# Patient Record
Sex: FEMALE | Race: OTHER | NOT HISPANIC OR LATINO | Employment: OTHER | URBAN - METROPOLITAN AREA
[De-identification: names, ages, dates, MRNs, and addresses within clinical notes are randomized per-mention and may not be internally consistent; named-entity substitution may affect disease eponyms.]

---

## 2021-04-26 ENCOUNTER — APPOINTMENT (OUTPATIENT)
Dept: ADMISSIONS | Facility: MEDICAL CENTER | Age: 56
End: 2021-04-26
Payer: MEDICARE

## 2021-04-28 ENCOUNTER — HOSPITAL ENCOUNTER (OUTPATIENT)
Dept: RADIOLOGY | Facility: MEDICAL CENTER | Age: 56
End: 2021-04-28
Attending: PODIATRIST | Admitting: PODIATRIST
Payer: MEDICARE

## 2021-04-28 ENCOUNTER — ANESTHESIA EVENT (OUTPATIENT)
Dept: SURGERY | Facility: MEDICAL CENTER | Age: 56
End: 2021-04-28
Payer: MEDICARE

## 2021-04-28 ENCOUNTER — PRE-ADMISSION TESTING (OUTPATIENT)
Dept: ADMISSIONS | Facility: MEDICAL CENTER | Age: 56
End: 2021-04-28
Attending: PODIATRIST
Payer: MEDICARE

## 2021-04-28 DIAGNOSIS — Z01.812 PRE-OPERATIVE LABORATORY EXAMINATION: ICD-10-CM

## 2021-04-28 DIAGNOSIS — Z01.818 PREOP EXAMINATION: ICD-10-CM

## 2021-04-28 LAB
ERYTHROCYTE [DISTWIDTH] IN BLOOD BY AUTOMATED COUNT: 47 FL (ref 35.9–50)
HCT VFR BLD AUTO: 44.2 % (ref 37–47)
HGB BLD-MCNC: 14.4 G/DL (ref 12–16)
MCH RBC QN AUTO: 30.7 PG (ref 27–33)
MCHC RBC AUTO-ENTMCNC: 32.6 G/DL (ref 33.6–35)
MCV RBC AUTO: 94.2 FL (ref 81.4–97.8)
PLATELET # BLD AUTO: 224 K/UL (ref 164–446)
PMV BLD AUTO: 9.2 FL (ref 9–12.9)
RBC # BLD AUTO: 4.69 M/UL (ref 4.2–5.4)
WBC # BLD AUTO: 8.1 K/UL (ref 4.8–10.8)

## 2021-04-28 PROCEDURE — 85027 COMPLETE CBC AUTOMATED: CPT

## 2021-04-28 PROCEDURE — 36415 COLL VENOUS BLD VENIPUNCTURE: CPT

## 2021-04-28 PROCEDURE — 72040 X-RAY EXAM NECK SPINE 2-3 VW: CPT

## 2021-04-28 RX ORDER — ALENDRONATE SODIUM 70 MG/1
70 TABLET ORAL DAILY
COMMUNITY
Start: 2021-04-13

## 2021-04-28 RX ORDER — MECLIZINE HYDROCHLORIDE 25 MG/1
25 TABLET ORAL PRN
COMMUNITY
Start: 2021-03-15

## 2021-04-28 RX ORDER — CARBOXYMETHYLCELLULOSE SODIUM 0.5 G/100ML
1 SOLUTION/ DROPS OPHTHALMIC 2 TIMES DAILY
COMMUNITY
Start: 2021-04-14

## 2021-04-28 RX ORDER — MELOXICAM 15 MG/1
1 TABLET ORAL DAILY
COMMUNITY
Start: 2021-04-13

## 2021-04-28 RX ORDER — PRENATAL VIT/IRON FUM/FOLIC AC 27MG-0.8MG
1 TABLET ORAL DAILY
COMMUNITY
Start: 2021-04-13

## 2021-04-28 RX ORDER — ERGOCALCIFEROL 1.25 MG/1
1 CAPSULE ORAL
COMMUNITY
Start: 2021-04-13

## 2021-04-28 RX ORDER — FOLIC ACID 1 MG/1
1 TABLET ORAL DAILY
COMMUNITY
Start: 2021-04-13

## 2021-04-28 RX ORDER — PANTOPRAZOLE SODIUM 40 MG/1
40 TABLET, DELAYED RELEASE ORAL DAILY
COMMUNITY
Start: 2021-04-13

## 2021-04-28 RX ORDER — CYCLOSPORINE 0.5 MG/ML
1 EMULSION OPHTHALMIC 2 TIMES DAILY
COMMUNITY
Start: 2021-04-13

## 2021-04-28 RX ORDER — MAGNESIUM OXIDE 400 MG/1
400 TABLET ORAL DAILY
COMMUNITY
Start: 2021-04-13

## 2021-04-28 NOTE — PREPROCEDURE INSTRUCTIONS
Hx and meds reviewed, pre op instructions given, handouts reviewed, questions answered.  Pt instructed to continue regularly prescribed medications through day before surgery.Per anesthesia protocol pt instructed to take these medications with a sip of water the day of surgery-restasis and lubricating eye drops, protonix and if needed proair inh.. Anesthesia fasting guidelines reviewed with pt. Covid testing done in [place],  states has been self isolating since tested on 4/24. Report to be faxed from [facility] in [place].

## 2021-04-29 ENCOUNTER — APPOINTMENT (OUTPATIENT)
Dept: RADIOLOGY | Facility: MEDICAL CENTER | Age: 56
End: 2021-04-29
Attending: PODIATRIST
Payer: MEDICARE

## 2021-04-29 ENCOUNTER — HOSPITAL ENCOUNTER (OUTPATIENT)
Facility: MEDICAL CENTER | Age: 56
End: 2021-04-29
Attending: PODIATRIST | Admitting: PODIATRIST
Payer: MEDICARE

## 2021-04-29 ENCOUNTER — ANESTHESIA (OUTPATIENT)
Dept: SURGERY | Facility: MEDICAL CENTER | Age: 56
End: 2021-04-29
Payer: MEDICARE

## 2021-04-29 VITALS
RESPIRATION RATE: 16 BRPM | OXYGEN SATURATION: 92 % | DIASTOLIC BLOOD PRESSURE: 71 MMHG | BODY MASS INDEX: 25.76 KG/M2 | WEIGHT: 139.99 LBS | TEMPERATURE: 97.7 F | HEIGHT: 62 IN | HEART RATE: 85 BPM | SYSTOLIC BLOOD PRESSURE: 109 MMHG

## 2021-04-29 PROCEDURE — 160009 HCHG ANES TIME/MIN: Performed by: PODIATRIST

## 2021-04-29 PROCEDURE — 501838 HCHG SUTURE GENERAL: Performed by: PODIATRIST

## 2021-04-29 PROCEDURE — 700101 HCHG RX REV CODE 250: Performed by: PODIATRIST

## 2021-04-29 PROCEDURE — 700111 HCHG RX REV CODE 636 W/ 250 OVERRIDE (IP): Performed by: STUDENT IN AN ORGANIZED HEALTH CARE EDUCATION/TRAINING PROGRAM

## 2021-04-29 PROCEDURE — 700105 HCHG RX REV CODE 258: Performed by: STUDENT IN AN ORGANIZED HEALTH CARE EDUCATION/TRAINING PROGRAM

## 2021-04-29 PROCEDURE — 160036 HCHG PACU - EA ADDL 30 MINS PHASE I: Performed by: PODIATRIST

## 2021-04-29 PROCEDURE — 160041 HCHG SURGERY MINUTES - EA ADDL 1 MIN LEVEL 4: Performed by: PODIATRIST

## 2021-04-29 PROCEDURE — 160046 HCHG PACU - 1ST 60 MINS PHASE II: Performed by: PODIATRIST

## 2021-04-29 PROCEDURE — A9270 NON-COVERED ITEM OR SERVICE: HCPCS | Performed by: STUDENT IN AN ORGANIZED HEALTH CARE EDUCATION/TRAINING PROGRAM

## 2021-04-29 PROCEDURE — 700101 HCHG RX REV CODE 250: Performed by: STUDENT IN AN ORGANIZED HEALTH CARE EDUCATION/TRAINING PROGRAM

## 2021-04-29 PROCEDURE — 160025 RECOVERY II MINUTES (STATS): Performed by: PODIATRIST

## 2021-04-29 PROCEDURE — 64447 NJX AA&/STRD FEMORAL NRV IMG: CPT | Performed by: PODIATRIST

## 2021-04-29 PROCEDURE — 700102 HCHG RX REV CODE 250 W/ 637 OVERRIDE(OP): Performed by: PODIATRIST

## 2021-04-29 PROCEDURE — 160002 HCHG RECOVERY MINUTES (STAT): Performed by: PODIATRIST

## 2021-04-29 PROCEDURE — 160029 HCHG SURGERY MINUTES - 1ST 30 MINS LEVEL 4: Performed by: PODIATRIST

## 2021-04-29 PROCEDURE — 500881 HCHG PACK, EXTREMITY: Performed by: PODIATRIST

## 2021-04-29 PROCEDURE — 160048 HCHG OR STATISTICAL LEVEL 1-5: Performed by: PODIATRIST

## 2021-04-29 PROCEDURE — 700102 HCHG RX REV CODE 250 W/ 637 OVERRIDE(OP): Performed by: STUDENT IN AN ORGANIZED HEALTH CARE EDUCATION/TRAINING PROGRAM

## 2021-04-29 PROCEDURE — 160035 HCHG PACU - 1ST 60 MINS PHASE I: Performed by: PODIATRIST

## 2021-04-29 PROCEDURE — A9270 NON-COVERED ITEM OR SERVICE: HCPCS | Performed by: PODIATRIST

## 2021-04-29 PROCEDURE — C1713 ANCHOR/SCREW BN/BN,TIS/BN: HCPCS | Performed by: PODIATRIST

## 2021-04-29 PROCEDURE — 502240 HCHG MISC OR SUPPLY RC 0272: Performed by: PODIATRIST

## 2021-04-29 PROCEDURE — A6223 GAUZE >16<=48 NO W/SAL W/O B: HCPCS | Performed by: PODIATRIST

## 2021-04-29 PROCEDURE — 700105 HCHG RX REV CODE 258: Performed by: PODIATRIST

## 2021-04-29 PROCEDURE — 64445 NJX AA&/STRD SCIATIC NRV IMG: CPT | Performed by: PODIATRIST

## 2021-04-29 PROCEDURE — 73620 X-RAY EXAM OF FOOT: CPT | Mod: LT

## 2021-04-29 PROCEDURE — 502000 HCHG MISC OR IMPLANTS RC 0278: Performed by: PODIATRIST

## 2021-04-29 DEVICE — IMPLANTABLE DEVICE: Type: IMPLANTABLE DEVICE | Site: FOOT | Status: FUNCTIONAL

## 2021-04-29 RX ORDER — OXYCODONE HCL 5 MG/5 ML
5 SOLUTION, ORAL ORAL
Status: DISCONTINUED | OUTPATIENT
Start: 2021-04-29 | End: 2021-04-29 | Stop reason: HOSPADM

## 2021-04-29 RX ORDER — ACETAMINOPHEN 500 MG
TABLET ORAL
Status: COMPLETED
Start: 2021-04-29 | End: 2021-04-29

## 2021-04-29 RX ORDER — HALOPERIDOL 5 MG/ML
1 INJECTION INTRAMUSCULAR
Status: DISCONTINUED | OUTPATIENT
Start: 2021-04-29 | End: 2021-04-29 | Stop reason: HOSPADM

## 2021-04-29 RX ORDER — ONDANSETRON 2 MG/ML
INJECTION INTRAMUSCULAR; INTRAVENOUS PRN
Status: DISCONTINUED | OUTPATIENT
Start: 2021-04-29 | End: 2021-04-29 | Stop reason: SURG

## 2021-04-29 RX ORDER — KETOROLAC TROMETHAMINE 30 MG/ML
INJECTION, SOLUTION INTRAMUSCULAR; INTRAVENOUS PRN
Status: DISCONTINUED | OUTPATIENT
Start: 2021-04-29 | End: 2021-04-29 | Stop reason: SURG

## 2021-04-29 RX ORDER — CEFAZOLIN SODIUM 1 G/3ML
INJECTION, POWDER, FOR SOLUTION INTRAMUSCULAR; INTRAVENOUS PRN
Status: DISCONTINUED | OUTPATIENT
Start: 2021-04-29 | End: 2021-04-29 | Stop reason: SURG

## 2021-04-29 RX ORDER — DEXAMETHASONE SODIUM PHOSPHATE 4 MG/ML
INJECTION, SOLUTION INTRA-ARTICULAR; INTRALESIONAL; INTRAMUSCULAR; INTRAVENOUS; SOFT TISSUE PRN
Status: DISCONTINUED | OUTPATIENT
Start: 2021-04-29 | End: 2021-04-29 | Stop reason: SURG

## 2021-04-29 RX ORDER — MIDAZOLAM HYDROCHLORIDE 1 MG/ML
INJECTION INTRAMUSCULAR; INTRAVENOUS PRN
Status: DISCONTINUED | OUTPATIENT
Start: 2021-04-29 | End: 2021-04-29 | Stop reason: SURG

## 2021-04-29 RX ORDER — ROPIVACAINE HYDROCHLORIDE 5 MG/ML
INJECTION, SOLUTION EPIDURAL; INFILTRATION; PERINEURAL
Status: COMPLETED | OUTPATIENT
Start: 2021-04-29 | End: 2021-04-29

## 2021-04-29 RX ORDER — BUPIVACAINE HYDROCHLORIDE 5 MG/ML
INJECTION, SOLUTION EPIDURAL; INTRACAUDAL
Status: DISCONTINUED | OUTPATIENT
Start: 2021-04-29 | End: 2021-04-29 | Stop reason: HOSPADM

## 2021-04-29 RX ORDER — SODIUM CHLORIDE, SODIUM LACTATE, POTASSIUM CHLORIDE, CALCIUM CHLORIDE 600; 310; 30; 20 MG/100ML; MG/100ML; MG/100ML; MG/100ML
INJECTION, SOLUTION INTRAVENOUS CONTINUOUS
Status: DISCONTINUED | OUTPATIENT
Start: 2021-04-29 | End: 2021-04-29

## 2021-04-29 RX ORDER — OXYCODONE HCL 5 MG/5 ML
10 SOLUTION, ORAL ORAL
Status: DISCONTINUED | OUTPATIENT
Start: 2021-04-29 | End: 2021-04-29 | Stop reason: HOSPADM

## 2021-04-29 RX ORDER — PHENYLEPHRINE HCL IN 0.9% NACL 0.5 MG/5ML
SYRINGE (ML) INTRAVENOUS PRN
Status: DISCONTINUED | OUTPATIENT
Start: 2021-04-29 | End: 2021-04-29 | Stop reason: SURG

## 2021-04-29 RX ORDER — ACETAMINOPHEN 325 MG/1
TABLET ORAL PRN
Status: DISCONTINUED | OUTPATIENT
Start: 2021-04-29 | End: 2021-04-29 | Stop reason: SURG

## 2021-04-29 RX ORDER — ATORVASTATIN CALCIUM 10 MG/1
10 TABLET, FILM COATED ORAL
COMMUNITY
Start: 2021-03-23

## 2021-04-29 RX ORDER — ONDANSETRON 2 MG/ML
4 INJECTION INTRAMUSCULAR; INTRAVENOUS
Status: DISCONTINUED | OUTPATIENT
Start: 2021-04-29 | End: 2021-04-29 | Stop reason: HOSPADM

## 2021-04-29 RX ADMIN — Medication 100 MCG: at 08:05

## 2021-04-29 RX ADMIN — Medication 100 MCG: at 07:48

## 2021-04-29 RX ADMIN — SODIUM CHLORIDE, POTASSIUM CHLORIDE, SODIUM LACTATE AND CALCIUM CHLORIDE: 600; 310; 30; 20 INJECTION, SOLUTION INTRAVENOUS at 06:49

## 2021-04-29 RX ADMIN — PROPOFOL 120 MG: 10 INJECTION, EMULSION INTRAVENOUS at 07:39

## 2021-04-29 RX ADMIN — PHENYLEPHRINE HYDROCHLORIDE 35 MCG/MIN: 10 INJECTION INTRAVENOUS at 08:22

## 2021-04-29 RX ADMIN — POVIDONE-IODINE 15 ML: 10 SOLUTION TOPICAL at 06:48

## 2021-04-29 RX ADMIN — ONDANSETRON 4 MG: 2 INJECTION INTRAMUSCULAR; INTRAVENOUS at 07:47

## 2021-04-29 RX ADMIN — ROPIVACAINE HYDROCHLORIDE 10 ML: 5 INJECTION, SOLUTION EPIDURAL; INFILTRATION; PERINEURAL at 07:17

## 2021-04-29 RX ADMIN — Medication 100 MCG: at 07:55

## 2021-04-29 RX ADMIN — MIDAZOLAM HYDROCHLORIDE 2 MG: 1 INJECTION, SOLUTION INTRAMUSCULAR; INTRAVENOUS at 07:39

## 2021-04-29 RX ADMIN — DEXAMETHASONE SODIUM PHOSPHATE 4 MG: 4 INJECTION, SOLUTION INTRAMUSCULAR; INTRAVENOUS at 07:47

## 2021-04-29 RX ADMIN — Medication 100 MCG: at 08:17

## 2021-04-29 RX ADMIN — EPHEDRINE SULFATE 10 MG: 50 INJECTION INTRAMUSCULAR; INTRAVENOUS; SUBCUTANEOUS at 07:49

## 2021-04-29 RX ADMIN — CEFAZOLIN 2 G: 1 INJECTION, POWDER, FOR SOLUTION INTRAVENOUS at 07:39

## 2021-04-29 RX ADMIN — FENTANYL CITRATE 50 MCG: 50 INJECTION, SOLUTION INTRAMUSCULAR; INTRAVENOUS at 07:39

## 2021-04-29 RX ADMIN — KETOROLAC TROMETHAMINE 15 MG: 30 INJECTION, SOLUTION INTRAMUSCULAR at 09:14

## 2021-04-29 RX ADMIN — Medication 50 MCG: at 08:22

## 2021-04-29 RX ADMIN — ACETAMINOPHEN 1000 MG: 325 TABLET, FILM COATED ORAL at 07:10

## 2021-04-29 RX ADMIN — Medication 100 MCG: at 08:11

## 2021-04-29 RX ADMIN — ROPIVACAINE HYDROCHLORIDE 20 ML: 5 INJECTION, SOLUTION EPIDURAL; INFILTRATION; PERINEURAL at 07:12

## 2021-04-29 ASSESSMENT — PAIN SCALES - GENERAL: PAIN_LEVEL: 0

## 2021-04-29 NOTE — ANESTHESIA PREPROCEDURE EVALUATION
55 yo F w/ hx of GERD, RA. NPO appropriate. METS >4.     Relevant Problems   Other   (+) Rheumatoid arthritis (HCC)       Physical Exam    Airway   Mallampati: III  TM distance: >3 FB  Neck ROM: full       Cardiovascular - normal exam  Rhythm: regular  Rate: normal  (-) murmur     Dental   (+) upper dentures, lower dentures           Pulmonary - normal exam  Breath sounds clear to auscultation     Abdominal    Neurological - normal exam                 Anesthesia Plan    ASA 2       Plan - general and peripheral nerve block     Peripheral nerve block will be post-op pain control  Airway plan will be LMA          Induction: intravenous    Postoperative Plan: Postoperative administration of opioids is intended.    Pertinent diagnostic labs and testing reviewed    Informed Consent:    Anesthetic plan and risks discussed with patient.    Use of blood products discussed with: patient whom consented to blood products.

## 2021-04-29 NOTE — ANESTHESIA PROCEDURE NOTES
Airway    Date/Time: 4/29/2021 7:41 AM  Performed by: Clem Landin M.D.  Authorized by: Clem Landin M.D.     Location:  OR  Urgency:  Elective  Indications for Airway Management:  Anesthesia      Spontaneous Ventilation: absent    Sedation Level:  Deep  Preoxygenated: Yes    Mask Difficulty Assessment:  0 - not attempted  Final Airway Type:  Supraglottic airway  Final Supraglottic Airway:  Standard LMA    SGA Size:  4  Number of Attempts at Approach:  1

## 2021-04-29 NOTE — ANESTHESIA TIME REPORT
Anesthesia Start and Stop Event Times     Date Time Event    4/29/2021 0720 Ready for Procedure     0730 Anesthesia Start     0934 Anesthesia Stop        Responsible Staff  04/29/21    Name Role Begin End    Min NATHAN Landin M.D. Anesth 0730 0934        Preop Diagnosis (Free Text):  Pre-op Diagnosis     HALLUX VALGUS (AQUIRED), LEFT FOOT/SPRAIN OF METATARSOPHALANGEAL JOINT OF LEFT LESSER TOE        Preop Diagnosis (Codes):    Post op Diagnosis  Hallux valgus      Premium Reason  Non-Premium    Comments:

## 2021-04-29 NOTE — OR NURSING
0933 PT RECEIVED IN PACU, REPORT RECEIVED.  PT RECEIVED IN STAGE 2, REPORT RECEIVED. VSS    0943 PT AWAKE, DENIES PAIN.     1007 PT TOLERATING PO FLUIDS.     1020 PT DENIES PAIN, NO MOVEMENT TO GREAT TOE, PT REPORTS LLE NUMB AND DENIES SENSATION TO TOUCH TO GREAT TOE.      1036 VSS. PT MEETS CRITERIA FOR TRANSFER TO STAGE 2.

## 2021-04-29 NOTE — OR NURSING
0623: Pt brought back from waiting room and assumed care.    0655: Patient allergies and NPO status verified, home medication reconciliation completed, belongings secured in a locker. Patient verbalizes understanding of pain scale, expected course of stay, and plan of care. Surgical site verified with patient. PIV access established. Sequentials in place as ordered. Triple aim completed by CNA.

## 2021-04-29 NOTE — ANESTHESIA PROCEDURE NOTES
Peripheral Block    Date/Time: 4/29/2021 7:17 AM  Performed by: Clem Landin M.D.  Authorized by: Clem Landin M.D.     Patient Location:  Pre-op  Start Time:  4/29/2021 7:17 AM  End Time:  4/29/2021 7:19 AM  Reason for Block: at surgeon's request and post-op pain management ONLY    patient identified, IV checked, site marked, risks and benefits discussed, surgical consent, monitors and equipment checked, pre-op evaluation and timeout performed    Patient Position:  Supine  Prep: ChloraPrep    Monitoring:  Heart rate, continuous pulse ox and cardiac monitor  Block Region:  Lower Extremity  Lower Extremity - Block Type:  Selective FEMORAL nerve block at the Adductor Canal    Laterality:  Left  Procedures: ultrasound guided  Image captured, interpreted and electronically stored.  Local Infiltration:  Lidocaine  Strength:  2 %  Dose:  2 ml  Block Type:  Single-shot  Needle Length:  100mm  Needle Gauge:  21 G  Needle Localization:  Ultrasound guidance  Injection Assessment:  Negative aspiration for heme, no paresthesia on injection, incremental injection and local visualized surrounding nerve on ultrasound  Evidence of intravascular injection: No     US Guided Selective Femoral Nerve Block at Adductor Canal:   US probe placed at mid-thigh level on externally rotated leg and femur identified.  Probe directed medially until Sartorius Muscle (SM), Femoral Artery (FA) and Saphenous Nerve (SN) identified in Adductor Canal (AC).  Needle inserted anterolateral to probe in an in plane approach into a subsartorial perivascular perineural position.  After negative aspiration LA injected with ease and visualized spreading within the AC.

## 2021-04-29 NOTE — OR NURSING
1047 Pt arrived from PACU post   FUSION, MTP JOINT, FIRST - FOR ARTHRODESIS Left General   EXCISION, METATARSAL BONE, HEAD -SECOND Left     , report received. Pt states pain is tollerable and denies nausea at this time. Pt dressing @ BS with assistance.    1105 Discharge instructions and medications gone over with Pt and ride. All questions answered. Pt meets DC criteria. PIV DC'd. Pt transported to POV via WC in stable condition.

## 2021-04-29 NOTE — ANESTHESIA POSTPROCEDURE EVALUATION
Patient: Darcy Longoria    Procedure Summary     Date: 04/29/21 Room / Location:  OR  / SURGERY [facility]    Anesthesia Start: 0730 Anesthesia Stop: 0934    Procedures:       FUSION, MTP JOINT, FIRST - FOR ARTHRODESIS (Left Foot)      EXCISION, METATARSAL BONE, HEAD -SECOND (Left Foot) Diagnosis: (HALLUX VALGUS (AQUIRED), LEFT FOOT/SPRAIN OF METATARSOPHALANGEAL JOINT OF LEFT LESSER TOE)    Surgeons: Santiago Milton D.P.M. Responsible Provider: Clem Landin M.D.    Anesthesia Type: general, peripheral nerve block ASA Status: 2          Final Anesthesia Type: general, peripheral nerve block  Last vitals  BP   Blood Pressure: 109/71    Temp   36.5 °C (97.7 °F)    Pulse   85   Resp   16    SpO2   92 %      Anesthesia Post Evaluation    Patient location during evaluation: bedside  Patient participation: complete - patient participated  Level of consciousness: awake and alert  Pain score: 0    Airway patency: patent  Anesthetic complications: no  Cardiovascular status: hemodynamically stable  Respiratory status: acceptable  Hydration status: euvolemic    PONV: none    patient able to participate, but full recovery from regional anesthesia has not occurred and is not expected within the stipulated timeframe for the completion of the evaluation      No complications documented.     Nurse Pain Score: 0 (NPRS)

## 2021-04-29 NOTE — ANESTHESIA PROCEDURE NOTES
Peripheral Block    Date/Time: 4/29/2021 7:12 AM  Performed by: Clem Landin M.D.  Authorized by: Clem Landin M.D.     Patient Location:  Pre-op  Start Time:  4/29/2021 7:12 AM  End Time:  4/29/2021 7:16 AM  Reason for Block: at surgeon's request and post-op pain management ONLY    patient identified, IV checked, site marked, risks and benefits discussed, surgical consent, monitors and equipment checked, pre-op evaluation and timeout performed    Patient Position:  Supine  Prep: ChloraPrep    Monitoring:  Heart rate, continuous pulse ox and cardiac monitor  Block Region:  Lower Extremity  Lower Extremity - Block Type:  SCIATIC nerve block, lateral approach    Laterality:  Left  Procedures: ultrasound guided  Image captured, interpreted and electronically stored.  Local Infiltration:  Lidocaine  Strength:  2 %  Dose:  3 ml  Block Type:  Single-shot  Needle Length:  100mm  Needle Gauge:  21 G  Needle Localization:  Ultrasound guidance  Injection Assessment:  Negative aspiration for heme, no paresthesia on injection, incremental injection, local visualized surrounding nerve on ultrasound and paresthesia-transient/resolved  Evidence of intravascular injection: No     US Guided Sciatic Nerve Block   US probe placed several cm proximal to popliteal crease on posterior thigh and scanned caudad and cephalad until Sciatic Nerve (SN) identified superficial/lateral to popliteal artery.  Needle inserted lateral to probe in an in plane approach under direct visualization to a perineural position.  After negative aspiration LA injected with ease and visualized surrounding the SN.

## 2021-04-29 NOTE — DISCHARGE INSTRUCTIONS
ACTIVITY: Rest and take it easy for the first 24 hours.  A responsible adult is recommended to remain with you during that time.  It is normal to feel sleepy.  We encourage you to not do anything that requires balance, judgment or coordination.    MILD FLU-LIKE SYMPTOMS ARE NORMAL. YOU MAY EXPERIENCE GENERALIZED MUSCLE ACHES, THROAT IRRITATION, HEADACHE AND/OR SOME NAUSEA.    FOR 24 HOURS DO NOT:  Drive, operate machinery or run household appliances.  Drink beer or alcoholic beverages.   Make important decisions or sign legal documents.    SPECIAL INSTRUCTIONS: ACTIVITY AS TOLERATED WITH CRUTCHES. PARTIAL WEIGHT BEARING ONLY DURING TRANSFERS   ELEVATE AND APPLY ICE PACK TO RIGHT LOWER EXTREMITY (20 MINUTES ON 20 MINUTES OFF) WHILE AWAKE  WEAR POST OPERATIVE BOOT DURING AMBULATION        DIET: To avoid nausea, slowly advance diet as tolerated, avoiding spicy or greasy foods for the first day.  Add more substantial food to your diet according to your physician's instructions.  Babies can be fed formula or breast milk as soon as they are hungry.  INCREASE FLUIDS AND FIBER TO AVOID CONSTIPATION.    SURGICAL DRESSING/BATHING: KEEP DRESSING CLEAN AND DRY. YOU MAY SHOWER WITH DRESSING COVERED.     FOLLOW-UP APPOINTMENT:  A follow-up appointment should be arranged with your doctor; call to schedule.     FOLLOW UP IN 1 WEEK.  CALL OFFICE FOR APPOINTMENT    You should CALL YOUR PHYSICIAN if you develop:  Fever greater than 101 degrees F.  Pain not relieved by medication, or persistent nausea or vomiting.  Excessive bleeding (blood soaking through dressing) or unexpected drainage from the wound.  Extreme redness or swelling around the incision site, drainage of pus or foul smelling drainage.  Inability to urinate or empty your bladder within 8 hours.  Problems with breathing or chest pain.    You should call 911 if you develop problems with breathing or chest pain.  If you are unable to contact your doctor or surgical  center, you should go to the nearest emergency room or urgent care center.      Physician's telephone #: DR. MCGARRY  [phone]    If any questions arise, call your doctor.  If your doctor is not available, please feel free to call the Surgical Center at [phone]. The Contact Center is open Monday through Friday 7AM to 5PM and may speak to a nurse at [phone], or toll free at [phone].     A registered nurse may call you a few days after your surgery to see how you are doing after your procedure.    MEDICATIONS: Resume taking daily medication.  Take prescribed pain medication with food.  If no medication is prescribed, you may take non-aspirin pain medication if needed.  PAIN MEDICATION CAN BE VERY CONSTIPATING.  Take a stool softener or laxative such as senokot, pericolace, or milk of magnesia if needed.    Prescription given PRE OPERATIVELY .      NO PAIN MEDICATION GIVEN IN RECOVERY.    If your physician has prescribed pain medication that includes Acetaminophen (Tylenol), do not take additional Acetaminophen (Tylenol) while taking the prescribed medication.      Depression / Suicide Risk    As you are discharged from this Critical access hospital facility, it is important to learn how to keep safe from harming yourself.    Recognize the warning signs:  · Abrupt changes in personality, positive or negative- including increase in energy   · Giving away possessions  · Change in eating patterns- significant weight changes-  positive or negative  · Change in sleeping patterns- unable to sleep or sleeping all the time   · Unwillingness or inability to communicate  · Depression  · Unusual sadness, discouragement and loneliness  · Talk of wanting to die  · Neglect of personal appearance   · Rebelliousness- reckless behavior  · Withdrawal from people/activities they love  · Confusion- inability to concentrate     If you or a loved one observes any of these behaviors or has concerns about self-harm, here's what  you can do:  · Talk about it- your feelings and reasons for harming yourself  · Remove any means that you might use to hurt yourself (examples: pills, rope, extension cords, firearm)  · Get professional help from the community (Mental Health, Substance Abuse, psychological counseling)  · Do not be alone:Call your Safe Contact- someone whom you trust who will be there for you.  · Call your local CRISIS HOTLINE 248-6644 or 403-088-5516  · Call your local Children's Mobile Crisis Response Team [place] (547) 334-1820 or www[employer]  · Call the toll free National Suicide Prevention Hotlines   · National Suicide Prevention Lifeline 395-797-DMQP (2237)  [employer] Collabor[employer] Line Network 800-SUICIDE (768-5310)    Peripheral Nerve Block Discharge Instructions from Same Day Surgery and Inpatient :    What to Expect - Lower Extremity  · The block may cause you to experience numbness and weakness in your calf and foot on the same side as your surgery  · Numbness, tingling and / or weakness are all normal. For some people, this may be an unpleasant sensation  · These issues will be resolved when the local anesthetic wears off   · You may experience numbness and tingling in your thigh on the same side as your surgery if the block medicine was injected at your groin area  · Numbness will make it difficult to walk  · You may have problems with balance and walking so be very careful   · Follow your surgeon's direction regarding weight bearing on your surgical limb  · Be very careful with your numb limb  Precautions  · The numbness may affect your balance  · Be careful when walking or moving around  · Your leg may be weak: be very careful putting weight on it  · If your surgeon did not specify a time, you should not bear weight for 24 hours  · Be sure to ask for help when you need it  · It is better to have help than to fall and hurt yourself  Prevent Injury  · Protect the limb like a baby  · Beware of exposing your limb to  "extreme heat or cold or trauma  · The limb may be injured without you noticing because it is numb  · Keep the limb elevated whenever possible  · Do not sleep on the limb  · Change the position of the limb regularly  · Avoid putting pressure on your surgical limb  Pain Control  · The initial block on the day of surgery will make your extremity feel \"numb\"  · Any consecutive injection including prior to discharge from the hospital will make your extremity feel \"numb\"  · You may feel an aching or burning when the local anesthesia starts to wear off  · Take pain pills as prescribed by your surgeon  · Call your surgeon or anesthesiologist if you do not have adequate pain control  ·   "

## 2021-04-30 NOTE — OP REPORT
DATE OF SERVICE:  04/29/2021     PREOPERATIVE DIAGNOSIS:  Painful hallux rigidus, Left foot.  Deformities due to rheumatoid arthritis     POSTOPERATIVE DIAGNOSIS:  Painful hallux rigidus, Left foot.   Deformities due to rheumatoid arthritis        PROCEDURE PERFORMED:  1st metatarsal phalangeal joint fusion. Second metatarsal head resection.      SURGEON:  TASHIA Milton DPM     ANESTHESIA TYPE:  General anesthesia with a posterior leg block performed by Dr. Jean     HEMOSTASIS:  Pneumatic ankle tourniquet set at 200 mmHg x 99 minutes.       INJECTABLES:  None     MATERIALS USED:  1st MTP joint fusion plate (small 5 degree) with three 12mm locking screws, two 14mm locking screws, one 14 mm non-locking screw and one 30 mm lag screws. All hardware was from [employer].  3-0, 4-0 Vicryl, and 4-0 nylon.       ESTIMATED BLOOD LOSS:  Approximately 3 mL.      SPECIMENS:  None.       CONDITION:  Stable.       INDICATIONS FOR PROCEDURE:  This is a 56-year-old female patient who presented   to clinic in [place] complaining of continued pain due to affects from chronic rheumatoid   arthritis.  She states her main complaint was her 2nd toe rides on top of the great toe,   causing discomfort in her shoes.  She tried many conservative options which included   different types of shoes, taping and padding.  We discussed conservative and other   surgical options.  I recommended fusing the MTP joint and doing a 2nd metatarsal head   resection.  No promises or guarantees were given, nor were they implied.  Today, the   n.p.o. status was confirmed and the consent form was signed, reviewed and placed in the patient's chart.       DESCRIPTION OF PROCEDURE:  Preoperatively, she received 2 g of Ancef.    Under mild sedation, she was brought to the operating room and placed on the   operating table in supine position.  Following general anesthesia, the foot   was scrubbed, prepped and draped in the usual aseptic manner.  Prior to that,    though the tourniquet was applied on the left lower leg, a critical pause   then performed to identify the correct patient, surgical site, and procedure.    All OR staff and surgeon were in agreement.  The Esmarch bandage was then utilized to   exsanguinate the right foot and the tourniquet was then inflated.       Attention was then turned to the patient's left first metatarsal phalangeal  joint where a approximately 5 cm linear incision was placed dorsally over this   joint and incision was placed just medial to the extensor hallucis longus   tendon.  The incision was then deepened through the subcutaneous tissues,   utilizing both sharp and blunt dissection.  Care was taken to visualize and   retract all vital neural and vascular structures.  Dissection continued down   to the level of the capsular and periosteal tissue.  At this point, a linear incision   was made through the periosteum and capsular structures.  The tissue was then  reflected in order to expose the first metatarsal phalangeal joint.  This cartilage on   the joint was severely eroded centrally of the 1st metatarsal head.  I removed   any prominent edges with a rongeur around the 1st metatarsal head.  I then used a cup and   cone reamer, utilizing proper technique to remove the articular cartilage off both   sides of this joint.  I flushed the site with copious amounts of sterile normal saline.    I used a small drill to fenestrate both sides of the joint.  Then while holding the hallux in   a more corrected position and verified under intraoperative x-ray, I drove a K-wire across   the joint for temporary fixation.  The MTP plate was selected and using proper technique,   the lag screw was driven across the joint using the precision guide in the system.  Next, the   12 mm screws were driven through the distal holes and the two 14 mm locking screws were  driven through two of the proximal screws while the 14 mm non-locking screw was driven  through   the compression slot.  I again flushed the wound with copious amounts of sterile water.    Attention was then directed to the second metatarsal head where a linear incision was made centrally   over the second MTP joint.  Dissection continued down to the level of the metatarsal head.  A saw   was then used to resect the head which was then passed from the operative field.  I attempted to   drive a K-wire through the second toe into 2nd metatarsal to help stabilize the toe while it heals,   however, I could not get good purchase into the diaphysis of the 2nd metatarsal because the bone   was too soft.  It kept sliding plantarly and sliding through the bone.  I then decided to stabilize the toe   with my dressing.  This area was then flushed with copious amounts of sterile water as well.    The periosteal and capsular structures were reapproximated utilizing   3-0 Vicryl and the subcutaneous tissues were reapproximated utilizing 4-0   Vicryl.  The skin edges were reapproximated utilizing 4-0 nylon in a running   interlocking suture technique.       The pneumatic ankle tourniquet was then deflated and a prompt hyperemic response   was noted to all aspects of the left foot.The incisions were then dressed with Xeroform,   4x4, Mike and soft roll.    A layer of Coban was then lightly placed around the dressing.       The patient tolerated the procedure and anesthesia well.  She was then   transferred from the operating room to the recovery room with vital signs   stable and vascular status intact.  Following a period of postoperative   monitoring, the patient will be discharged home with the following written and   oral postoperative instructions:     1.  The patient is to keep the dressing clean, dry, and intact.  If the   dressing becomes wet or soiled, she is to contact the office immediately.    2.  She was given a postoperative boot to help provide protection to the   surgical site.    3.  She was given a  prescription for norco to be taken as directed for pain   4.  She was given instructions for icing and elevation to help with   postoperative pain and swelling.    5.  She is to follow up in my office in 1 week, but can call with any other   questions or concerns.

## (undated) DEVICE — LACTATED RINGERS INJ 1000 ML - (14EA/CA 60CA/PF)

## (undated) DEVICE — DRESSING 3X8 ADAPTIC GAUZE - NON-ADHERING (36/PK 6PK/BX)

## (undated) DEVICE — SENSOR SPO2 NEO LNCS ADHESIVE (20/BX) SEE USER NOTES

## (undated) DEVICE — TOWEL STOP TIMEOUT SAFETY FLAG (40EA/CA)

## (undated) DEVICE — GOWN WARMING STANDARD FLEX - (30/CA)

## (undated) DEVICE — Device

## (undated) DEVICE — MASK ANESTHESIA ADULT  - (100/CA)

## (undated) DEVICE — HEAD HOLDER JUNIOR/ADULT

## (undated) DEVICE — SET EXTENSION WITH 2 PORTS (48EA/CA) ***PART #2C8610 IS A SUBSTITUTE*****

## (undated) DEVICE — DRESSING ABDOMINAL PAD STERILE 8 X 10" (360EA/CA)"

## (undated) DEVICE — DRAPE LARGE 3 QUARTER - (20/CA)

## (undated) DEVICE — DRAPE 36X28IN RAD CARM BND BG - (25/CA) O

## (undated) DEVICE — DRILL

## (undated) DEVICE — PROTECTOR ULNA NERVE - (36PR/CA)

## (undated) DEVICE — D-5-W, INJ., 50 ML (2B0086)

## (undated) DEVICE — TUBING CLEARLINK DUO-VENT - C-FLO (48EA/CA)

## (undated) DEVICE — SUTURE 3-0 ETHILON PS-1 (36PK/BX)

## (undated) DEVICE — K-WIRE

## (undated) DEVICE — SUTURE GENERAL

## (undated) DEVICE — SUCTION INSTRUMENT YANKAUER BULBOUS TIP W/O VENT (50EA/CA)

## (undated) DEVICE — CANISTER SUCTION 3000ML MECHANICAL FILTER AUTO SHUTOFF MEDI-VAC NONSTERILE LF DISP  (40EA/CA)

## (undated) DEVICE — GOWN SURGEONS X-LARGE - DISP. (30/CA)

## (undated) DEVICE — SUTURE 3-0 VICRYL PLUS SH - 8X 18 INCH (12/BX)

## (undated) DEVICE — ELECTRODE 850 FOAM ADHESIVE - HYDROGEL RADIOTRNSPRNT (50/PK)

## (undated) DEVICE — KIT ANESTHESIA W/CIRCUIT & 3/LT BAG W/FILTER (20EA/CA)

## (undated) DEVICE — BLADE SURGICAL #15 - (50/BX 3BX/CA)

## (undated) DEVICE — PADDING CAST 4 IN STERILE - 4 X 4 YDS (24/CA)

## (undated) DEVICE — NEPTUNE 4 PORT MANIFOLD - (20/PK)

## (undated) DEVICE — PACK LOWER EXTREMITY - (2/CA)

## (undated) DEVICE — BANDAGE ELASTIC 4 HONEYCOMB - 4"X5YD LF (20/CA)"

## (undated) DEVICE — SODIUM CHL IRRIGATION 0.9% 1000ML (12EA/CA)

## (undated) DEVICE — SLEEVE, VASO, THIGH, MED

## (undated) DEVICE — ELECTRODE DUAL RETURN W/ CORD - (50/PK)